# Patient Record
Sex: MALE | HISPANIC OR LATINO | URBAN - METROPOLITAN AREA
[De-identification: names, ages, dates, MRNs, and addresses within clinical notes are randomized per-mention and may not be internally consistent; named-entity substitution may affect disease eponyms.]

---

## 2022-10-10 ENCOUNTER — OFFICE (OUTPATIENT)
Dept: URBAN - METROPOLITAN AREA CLINIC 63 | Facility: CLINIC | Age: 57
Setting detail: OPHTHALMOLOGY
End: 2022-10-10
Payer: COMMERCIAL

## 2022-10-10 DIAGNOSIS — H01.001: ICD-10-CM

## 2022-10-10 DIAGNOSIS — H35.3131: ICD-10-CM

## 2022-10-10 DIAGNOSIS — H52.4: ICD-10-CM

## 2022-10-10 DIAGNOSIS — E11.9: ICD-10-CM

## 2022-10-10 DIAGNOSIS — H43.811: ICD-10-CM

## 2022-10-10 PROBLEM — H01.004 BLEPHARITIS; RIGHT UPPER LID, LEFT UPPER LID: Status: ACTIVE | Noted: 2022-10-10

## 2022-10-10 PROBLEM — H16.223 DRY EYE SYNDROME K SICCA; BOTH EYES: Status: ACTIVE | Noted: 2022-10-10

## 2022-10-10 PROCEDURE — 92015 DETERMINE REFRACTIVE STATE: CPT | Performed by: STUDENT IN AN ORGANIZED HEALTH CARE EDUCATION/TRAINING PROGRAM

## 2022-10-10 PROCEDURE — 92004 COMPRE OPH EXAM NEW PT 1/>: CPT | Performed by: STUDENT IN AN ORGANIZED HEALTH CARE EDUCATION/TRAINING PROGRAM

## 2022-10-10 PROCEDURE — 92134 CPTRZ OPH DX IMG PST SGM RTA: CPT | Performed by: STUDENT IN AN ORGANIZED HEALTH CARE EDUCATION/TRAINING PROGRAM

## 2022-10-10 ASSESSMENT — REFRACTION_CURRENTRX
OS_AXIS: 079
OD_VPRISM_DIRECTION: SV
OS_CYLINDER: -0.25
OD_CYLINDER: -1.00
OD_OVR_VA: 20/
OD_SPHERE: -0.50
OS_VPRISM_DIRECTION: SV
OD_AXIS: 099
OS_OVR_VA: 20/
OS_SPHERE: +0.25

## 2022-10-10 ASSESSMENT — AXIALLENGTH_DERIVED
OS_AL: 23.1389
OD_AL: 23.6114
OS_AL: 23.1389
OD_AL: 23.5143

## 2022-10-10 ASSESSMENT — REFRACTION_AUTOREFRACTION
OS_CYLINDER: -1.25
OS_AXIS: 081
OD_CYLINDER: -0.75
OD_SPHERE: -0.25
OD_AXIS: 094
OS_SPHERE: +0.75

## 2022-10-10 ASSESSMENT — VISUAL ACUITY
OD_BCVA: 20/25
OS_BCVA: 20/30-1

## 2022-10-10 ASSESSMENT — KERATOMETRY
OS_K2POWER_DIOPTERS: 45.00
OD_K2POWER_DIOPTERS: 44.50
OS_K1POWER_DIOPTERS: 44.25
OD_AXISANGLE_DEGREES: 141
OS_AXISANGLE_DEGREES: 006
OD_K1POWER_DIOPTERS: 44.25

## 2022-10-10 ASSESSMENT — LID EXAM ASSESSMENTS
OD_BLEPHARITIS: RUL 1+
OD_MEIBOMITIS: RLL 1+
OS_BLEPHARITIS: LUL 1+
OS_MEIBOMITIS: LLL 1+

## 2022-10-10 ASSESSMENT — REFRACTION_MANIFEST
OS_ADD: +2.50
OS_SPHERE: +0.75
OD_ADD: +2.50
OS_VA1: 20/20
OD_SPHERE: -0.50
OU_VA: 20/20
OS_AXIS: 080
OD_CYLINDER: -0.75
OD_AXIS: 090
OD_VA1: 20/20
OS_CYLINDER: -1.25

## 2022-10-10 ASSESSMENT — TONOMETRY
OD_IOP_MMHG: 17
OS_IOP_MMHG: 18

## 2022-10-10 ASSESSMENT — SUPERFICIAL PUNCTATE KERATITIS (SPK)
OD_SPK: 1+
OS_SPK: 1+

## 2022-10-10 ASSESSMENT — CONFRONTATIONAL VISUAL FIELD TEST (CVF)
OS_FINDINGS: FULL
OD_FINDINGS: FULL

## 2022-10-10 ASSESSMENT — SPHEQUIV_DERIVED
OD_SPHEQUIV: -0.625
OD_SPHEQUIV: -0.875
OS_SPHEQUIV: 0.125
OS_SPHEQUIV: 0.125

## 2022-10-10 ASSESSMENT — LID POSITION - DERMATOCHALASIS
OS_DERMATOCHALASIS: LUL 1+
OD_DERMATOCHALASIS: RUL 1+

## 2023-04-10 ENCOUNTER — OFFICE (OUTPATIENT)
Dept: URBAN - METROPOLITAN AREA CLINIC 63 | Facility: CLINIC | Age: 58
Setting detail: OPHTHALMOLOGY
End: 2023-04-10
Payer: COMMERCIAL

## 2023-04-10 DIAGNOSIS — H43.811: ICD-10-CM

## 2023-04-10 DIAGNOSIS — E11.9: ICD-10-CM

## 2023-04-10 DIAGNOSIS — H01.001: ICD-10-CM

## 2023-04-10 DIAGNOSIS — H35.3131: ICD-10-CM

## 2023-04-10 PROCEDURE — 92134 CPTRZ OPH DX IMG PST SGM RTA: CPT | Performed by: STUDENT IN AN ORGANIZED HEALTH CARE EDUCATION/TRAINING PROGRAM

## 2023-04-10 PROCEDURE — 92014 COMPRE OPH EXAM EST PT 1/>: CPT | Performed by: STUDENT IN AN ORGANIZED HEALTH CARE EDUCATION/TRAINING PROGRAM

## 2023-04-10 ASSESSMENT — SPHEQUIV_DERIVED
OS_SPHEQUIV: 0
OD_SPHEQUIV: -0.625
OS_SPHEQUIV: 0.125
OD_SPHEQUIV: -0.875

## 2023-04-10 ASSESSMENT — REFRACTION_CURRENTRX
OD_OVR_VA: 20/
OS_VPRISM_DIRECTION: SV
OD_AXIS: 099
OS_AXIS: 079
OD_VPRISM_DIRECTION: SV
OS_CYLINDER: -0.25
OS_SPHERE: +0.25
OD_SPHERE: -0.50
OD_CYLINDER: -1.00
OS_OVR_VA: 20/

## 2023-04-10 ASSESSMENT — REFRACTION_MANIFEST
OS_AXIS: 080
OD_AXIS: 090
OS_ADD: +2.50
OS_VA1: 20/20
OD_VA1: 20/20
OD_CYLINDER: -0.75
OS_CYLINDER: -1.25
OD_SPHERE: -0.50
OS_SPHERE: +0.75
OD_ADD: +2.50
OU_VA: 20/20

## 2023-04-10 ASSESSMENT — REFRACTION_AUTOREFRACTION
OD_SPHERE: 0.00
OS_AXIS: 095
OS_SPHERE: +0.50
OD_AXIS: 093
OD_CYLINDER: -1.25
OS_CYLINDER: -1.00

## 2023-04-10 ASSESSMENT — LID EXAM ASSESSMENTS
OS_MEIBOMITIS: LLL 1+
OD_MEIBOMITIS: RLL 1+
OD_BLEPHARITIS: RUL 1+
OS_BLEPHARITIS: LUL 1+

## 2023-04-10 ASSESSMENT — KERATOMETRY
OS_K2POWER_DIOPTERS: 45.00
OD_AXISANGLE_DEGREES: 141
OS_AXISANGLE_DEGREES: 006
OD_K1POWER_DIOPTERS: 44.25
OS_K1POWER_DIOPTERS: 44.25
OD_K2POWER_DIOPTERS: 44.50

## 2023-04-10 ASSESSMENT — SUPERFICIAL PUNCTATE KERATITIS (SPK)
OS_SPK: 1+
OD_SPK: 1+

## 2023-04-10 ASSESSMENT — AXIALLENGTH_DERIVED
OD_AL: 23.6114
OD_AL: 23.5143
OS_AL: 23.1858
OS_AL: 23.1389

## 2023-04-10 ASSESSMENT — CONFRONTATIONAL VISUAL FIELD TEST (CVF)
OS_FINDINGS: FULL
OD_FINDINGS: FULL

## 2023-04-10 ASSESSMENT — TONOMETRY
OD_IOP_MMHG: 16
OS_IOP_MMHG: 17

## 2023-04-10 ASSESSMENT — LID POSITION - DERMATOCHALASIS
OS_DERMATOCHALASIS: LUL 1+
OD_DERMATOCHALASIS: RUL 1+

## 2023-04-10 ASSESSMENT — VISUAL ACUITY
OS_BCVA: 20/30
OD_BCVA: 20/25

## 2023-10-13 ENCOUNTER — OFFICE (OUTPATIENT)
Dept: URBAN - METROPOLITAN AREA CLINIC 63 | Facility: CLINIC | Age: 58
Setting detail: OPHTHALMOLOGY
End: 2023-10-13
Payer: COMMERCIAL

## 2023-10-13 DIAGNOSIS — H01.001: ICD-10-CM

## 2023-10-13 DIAGNOSIS — H43.811: ICD-10-CM

## 2023-10-13 DIAGNOSIS — H16.223: ICD-10-CM

## 2023-10-13 DIAGNOSIS — H11.153: ICD-10-CM

## 2023-10-13 DIAGNOSIS — H01.004: ICD-10-CM

## 2023-10-13 DIAGNOSIS — E11.9: ICD-10-CM

## 2023-10-13 DIAGNOSIS — H35.3131: ICD-10-CM

## 2023-10-13 PROCEDURE — 92014 COMPRE OPH EXAM EST PT 1/>: CPT | Performed by: STUDENT IN AN ORGANIZED HEALTH CARE EDUCATION/TRAINING PROGRAM

## 2023-10-13 PROCEDURE — 92134 CPTRZ OPH DX IMG PST SGM RTA: CPT | Performed by: STUDENT IN AN ORGANIZED HEALTH CARE EDUCATION/TRAINING PROGRAM

## 2023-10-13 ASSESSMENT — REFRACTION_MANIFEST
OU_VA: 20/20
OD_ADD: +2.50
OS_SPHERE: +0.75
OS_CYLINDER: -1.25
OD_SPHERE: -0.50
OD_VA1: 20/20
OS_AXIS: 080
OS_ADD: +2.50
OD_AXIS: 090
OS_VA1: 20/20
OD_CYLINDER: -0.75

## 2023-10-13 ASSESSMENT — SPHEQUIV_DERIVED
OD_SPHEQUIV: -0.75
OS_SPHEQUIV: 0.125
OS_SPHEQUIV: -0.25
OD_SPHEQUIV: -0.875

## 2023-10-13 ASSESSMENT — LID EXAM ASSESSMENTS
OD_MEIBOMITIS: RLL 1+
OS_BLEPHARITIS: LUL 1+
OS_MEIBOMITIS: LLL 1+
OD_BLEPHARITIS: RUL 1+

## 2023-10-13 ASSESSMENT — CONFRONTATIONAL VISUAL FIELD TEST (CVF)
OS_FINDINGS: FULL
OD_FINDINGS: FULL

## 2023-10-13 ASSESSMENT — AXIALLENGTH_DERIVED
OS_AL: 23.1912
OD_AL: 23.4289
OD_AL: 23.381
OS_AL: 23.0509

## 2023-10-13 ASSESSMENT — KERATOMETRY
OD_AXISANGLE_DEGREES: 167
OD_K1POWER_DIOPTERS: 44.75
OS_K2POWER_DIOPTERS: 45.25
OS_K1POWER_DIOPTERS: 44.50
OS_AXISANGLE_DEGREES: 008
OD_K2POWER_DIOPTERS: 45.00

## 2023-10-13 ASSESSMENT — REFRACTION_CURRENTRX
OD_VPRISM_DIRECTION: SV
OS_SPHERE: +0.25
OD_SPHERE: -0.50
OD_OVR_VA: 20/
OS_CYLINDER: -0.25
OD_CYLINDER: -1.00
OS_VPRISM_DIRECTION: SV
OS_AXIS: 079
OD_AXIS: 099
OS_OVR_VA: 20/

## 2023-10-13 ASSESSMENT — LID POSITION - DERMATOCHALASIS
OD_DERMATOCHALASIS: RUL 1+
OS_DERMATOCHALASIS: LUL 1+

## 2023-10-13 ASSESSMENT — VISUAL ACUITY
OD_BCVA: 20/30
OS_BCVA: 20/40

## 2023-10-13 ASSESSMENT — REFRACTION_AUTOREFRACTION
OS_CYLINDER: -1.00
OS_AXIS: 091
OD_AXIS: 092
OS_SPHERE: +0.25
OD_CYLINDER: -1.00
OD_SPHERE: -0.25

## 2023-10-13 ASSESSMENT — SUPERFICIAL PUNCTATE KERATITIS (SPK)
OD_SPK: 1+
OS_SPK: 1+

## 2023-10-13 ASSESSMENT — TONOMETRY
OS_IOP_MMHG: 18
OD_IOP_MMHG: 16

## 2024-01-18 ENCOUNTER — APPOINTMENT (OUTPATIENT)
Dept: ORTHOPEDIC SURGERY | Facility: CLINIC | Age: 59
End: 2024-01-18
Payer: MEDICAID

## 2024-01-18 VITALS — WEIGHT: 167 LBS | HEIGHT: 65 IN | BODY MASS INDEX: 27.82 KG/M2

## 2024-01-18 DIAGNOSIS — E11.9 TYPE 2 DIABETES MELLITUS W/OUT COMPLICATIONS: ICD-10-CM

## 2024-01-18 PROCEDURE — 99204 OFFICE O/P NEW MOD 45 MIN: CPT

## 2024-01-18 PROCEDURE — 27786 TREATMENT OF ANKLE FRACTURE: CPT

## 2024-01-18 NOTE — HISTORY OF PRESENT ILLNESS
[Gradual] : gradual [de-identified] : Had a fall 6 days ago, injured right ankle, has pain and swelling. Was xray'd at ER, placed in CAM boot/crutches [] : no [FreeTextEntry1] : right leg  [FreeTextEntry3] : 1/12/24 [FreeTextEntry5] : patient fell inside home. [facility] Had xrays and was given boot and crutches at hospital.

## 2024-01-18 NOTE — PHYSICAL EXAM
[Right] : right foot and ankle [Moderate] : moderate swelling of lateral ankle [] : no deltoid ligament tenderness

## 2024-01-18 NOTE — ASSESSMENT
[FreeTextEntry1] : May progress WBAT in CAM boot Daily baby ASA for DVT prophylaxis Ibuprofen for pain Note for work provided

## 2024-01-18 NOTE — IMAGING
[Right] : right ankle [Lateral malleolus fracture] : Lateral malleolus fracture [FreeTextEntry9] : non displaced, talus maintained in mortise

## 2024-02-01 ENCOUNTER — APPOINTMENT (OUTPATIENT)
Dept: ORTHOPEDIC SURGERY | Facility: CLINIC | Age: 59
End: 2024-02-01
Payer: COMMERCIAL

## 2024-02-01 VITALS — BODY MASS INDEX: 27.82 KG/M2 | HEIGHT: 65 IN | WEIGHT: 167 LBS

## 2024-02-01 PROCEDURE — 99024 POSTOP FOLLOW-UP VISIT: CPT

## 2024-02-01 PROCEDURE — 73610 X-RAY EXAM OF ANKLE: CPT | Mod: RT

## 2024-02-01 NOTE — ASSESSMENT
[FreeTextEntry1] : continue with the boot for ambulation. 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

## 2024-02-01 NOTE — HISTORY OF PRESENT ILLNESS
[Gradual] : gradual [Dull/Aching] : dull/aching [Sharp] : sharp [de-identified] :  02/01/2024: now 3 + weeks s/p injury continues in the boot and out of work uses crutches for ambulation  Had a fall 6 days ago, injured right ankle, has pain and swelling. Was xray'd at ER, placed in CAM boot/crutches [] : no [FreeTextEntry1] : right leg  [FreeTextEntry3] : 1/12/24 [FreeTextEntry5] : patient fell inside home. [facility]. Had xrays and was given boot and crutches at hospital.

## 2024-02-01 NOTE — IMAGING
[Right] : right ankle [Lateral malleolus fracture] : Lateral malleolus fracture [FreeTextEntry9] : non displaced, talus maintained in mortise, position maintained

## 2024-02-01 NOTE — PHYSICAL EXAM
[Right] : right foot and ankle [] : ambulation with crutches [de-identified] : plantar flexion 15 degrees [TWNoteComboBox7] : dorsiflexion 10 degrees

## 2024-02-22 ENCOUNTER — APPOINTMENT (OUTPATIENT)
Dept: ORTHOPEDIC SURGERY | Facility: CLINIC | Age: 59
End: 2024-02-22
Payer: COMMERCIAL

## 2024-02-22 PROCEDURE — 99024 POSTOP FOLLOW-UP VISIT: CPT

## 2024-02-22 PROCEDURE — 73610 X-RAY EXAM OF ANKLE: CPT | Mod: RT

## 2024-02-22 NOTE — IMAGING
[Right] : right ankle [Lateral malleolus fracture] : Lateral malleolus fracture [FreeTextEntry9] : non displaced, talus maintained in mortise, position maintained, scant callous

## 2024-02-22 NOTE — HISTORY OF PRESENT ILLNESS
[Dull/Aching] : dull/aching [Gradual] : gradual [Sharp] : sharp [de-identified] :  02/01/2024: now 3 + weeks s/p injury continues in the boot and out of work uses crutches for ambulation  Had a fall 6 days ago, injured right ankle, has pain and swelling. Was xray'd at ER, placed in CAM boot/crutches [] : no [FreeTextEntry1] : right leg  [FreeTextEntry3] : 1/12/24 [FreeTextEntry5] : patient fell inside home. [facility] Had xrays and was given boot and crutches at hospital.

## 2024-02-22 NOTE — PHYSICAL EXAM
[Right] : right foot and ankle [] : ambulation with crutches [de-identified] : plantar flexion 15 degrees [TWNoteComboBox7] : dorsiflexion 10 degrees

## 2024-03-14 ENCOUNTER — APPOINTMENT (OUTPATIENT)
Dept: ORTHOPEDIC SURGERY | Facility: CLINIC | Age: 59
End: 2024-03-14
Payer: COMMERCIAL

## 2024-03-14 DIAGNOSIS — S82.64XA NONDISPLACED FRACTURE OF LATERAL MALLEOLUS OF RIGHT FIBULA, INITIAL ENCOUNTER FOR CLOSED FRACTURE: ICD-10-CM

## 2024-03-14 PROCEDURE — 73610 X-RAY EXAM OF ANKLE: CPT | Mod: RT

## 2024-03-14 PROCEDURE — 99024 POSTOP FOLLOW-UP VISIT: CPT

## 2024-03-14 NOTE — REASON FOR VISIT
[FreeTextEntry2] : 03/14/2024 8 weeks s/p fx right lateral mal, no further pain in CAM 02/22/2024 5 weeks s/p fx right lateral mal, still some soreness FWB in CAM

## 2024-03-14 NOTE — ASSESSMENT
[FreeTextEntry1] : d/c CAM, regular sneaker, can use ASO warm soaks, ROM, heel/toe raises PT ordered

## 2024-03-14 NOTE — HISTORY OF PRESENT ILLNESS
[Gradual] : gradual [Dull/Aching] : dull/aching [Sharp] : sharp [de-identified] :  02/01/2024: now 3 + weeks s/p injury continues in the boot and out of work uses crutches for ambulation  Had a fall 6 days ago, injured right ankle, has pain and swelling. Was xray'd at ER, placed in CAM boot/crutches [] : no [FreeTextEntry1] : right leg  [FreeTextEntry3] : 1/12/24 [FreeTextEntry5] : patient fell inside home. [facility]. Had xrays and was given boot and crutches at hospital.

## 2024-03-14 NOTE — PHYSICAL EXAM
[Right] : right foot and ankle [] : Sensation present to light touch in all distributions [de-identified] : plantar flexion 15 degrees [TWNoteComboBox7] : dorsiflexion 10 degrees

## 2024-03-14 NOTE — IMAGING
[Right] : right ankle [Healed fracture] : Healed fracture [FreeTextEntry9] : non displaced, talus maintained in mortise, position maintained, scant callous

## 2024-04-18 ENCOUNTER — APPOINTMENT (OUTPATIENT)
Dept: ORTHOPEDIC SURGERY | Facility: CLINIC | Age: 59
End: 2024-04-18

## 2024-10-15 ENCOUNTER — OFFICE (OUTPATIENT)
Dept: URBAN - METROPOLITAN AREA CLINIC 63 | Facility: CLINIC | Age: 59
Setting detail: OPHTHALMOLOGY
End: 2024-10-15
Payer: COMMERCIAL

## 2024-10-15 DIAGNOSIS — H01.004: ICD-10-CM

## 2024-10-15 DIAGNOSIS — H35.3131: ICD-10-CM

## 2024-10-15 DIAGNOSIS — H43.811: ICD-10-CM

## 2024-10-15 DIAGNOSIS — H52.4: ICD-10-CM

## 2024-10-15 DIAGNOSIS — H16.222: ICD-10-CM

## 2024-10-15 DIAGNOSIS — H16.223: ICD-10-CM

## 2024-10-15 DIAGNOSIS — H01.001: ICD-10-CM

## 2024-10-15 PROBLEM — H16.221 DRY EYE SYNDROME K SICCA; RIGHT EYE, LEFT EYE, BOTH EYES: Status: ACTIVE | Noted: 2024-10-15

## 2024-10-15 PROCEDURE — 92015 DETERMINE REFRACTIVE STATE: CPT | Performed by: OPHTHALMOLOGY

## 2024-10-15 PROCEDURE — 83861 MICROFLUID ANALY TEARS: CPT | Mod: QW | Performed by: OPHTHALMOLOGY

## 2024-10-15 PROCEDURE — 99214 OFFICE O/P EST MOD 30 MIN: CPT | Performed by: OPHTHALMOLOGY

## 2024-10-15 PROCEDURE — 83861 MICROFLUID ANALY TEARS: CPT | Mod: QW,LT | Performed by: OPHTHALMOLOGY

## 2024-10-15 PROCEDURE — 92250 FUNDUS PHOTOGRAPHY W/I&R: CPT | Performed by: OPHTHALMOLOGY

## 2024-10-15 ASSESSMENT — REFRACTION_MANIFEST
OD_AXIS: 090
OS_VA1: 20/20
OD_CYLINDER: -0.75
OS_SPHERE: +1.25
OD_AXIS: 090
OS_AXIS: 080
OD_SPHERE: -0.50
OD_CYLINDER: -1.00
OS_SPHERE: +0.75
OD_ADD: +2.50
OS_CYLINDER: -1.25
OS_ADD: +2.50
OD_VA1: 20/20
OD_VA1: 20/30
OS_AXIS: 080
OD_ADD: +2.50
OU_VA: 20/25
OS_CYLINDER: -1.25
OD_SPHERE: -0.25
OS_VA1: 20/30
OU_VA: 20/20
OS_ADD: +2.50

## 2024-10-15 ASSESSMENT — REFRACTION_CURRENTRX
OS_OVR_VA: 20/
OS_SPHERE: +0.25
OD_OVR_VA: 20/
OS_CYLINDER: -0.25
OD_AXIS: 099
OD_VPRISM_DIRECTION: SV
OS_VPRISM_DIRECTION: SV
OD_CYLINDER: -1.00
OS_AXIS: 079
OD_SPHERE: -0.50

## 2024-10-15 ASSESSMENT — CONFRONTATIONAL VISUAL FIELD TEST (CVF)
OS_FINDINGS: FULL
OD_FINDINGS: FULL

## 2024-10-15 ASSESSMENT — LID POSITION - DERMATOCHALASIS
OD_DERMATOCHALASIS: RUL 1+
OS_DERMATOCHALASIS: LUL 1+

## 2024-10-15 ASSESSMENT — KERATOMETRY
OD_AXISANGLE_DEGREES: 167
OS_AXISANGLE_DEGREES: 008
OS_K1POWER_DIOPTERS: 44.50
OD_K2POWER_DIOPTERS: 45.00
OS_K2POWER_DIOPTERS: 45.25
OD_K1POWER_DIOPTERS: 44.75

## 2024-10-15 ASSESSMENT — SUPERFICIAL PUNCTATE KERATITIS (SPK)
OS_SPK: 1+
OD_SPK: 1+

## 2024-10-15 ASSESSMENT — VISUAL ACUITY
OS_BCVA: 20/40
OD_BCVA: 20/30

## 2024-10-15 ASSESSMENT — LID EXAM ASSESSMENTS
OS_MEIBOMITIS: LLL 1+
OS_BLEPHARITIS: LUL 1+
OD_MEIBOMITIS: RLL 1+
OD_BLEPHARITIS: RUL 1+

## 2024-10-15 ASSESSMENT — TONOMETRY
OD_IOP_MMHG: 14
OS_IOP_MMHG: 15

## 2024-10-15 ASSESSMENT — REFRACTION_AUTOREFRACTION
OS_SPHERE: +0.75
OD_AXIS: 098
OD_CYLINDER: -1.25
OD_SPHERE: -0.50
OS_CYLINDER: -1.25
OS_AXIS: 087